# Patient Record
Sex: FEMALE | Race: WHITE | NOT HISPANIC OR LATINO | Employment: UNEMPLOYED | URBAN - METROPOLITAN AREA
[De-identification: names, ages, dates, MRNs, and addresses within clinical notes are randomized per-mention and may not be internally consistent; named-entity substitution may affect disease eponyms.]

---

## 2018-03-07 ENCOUNTER — HOSPITAL ENCOUNTER (OUTPATIENT)
Dept: PHYSICAL THERAPY | Facility: HOSPITAL | Age: 48
Setting detail: THERAPIES SERIES
Discharge: HOME | End: 2018-03-07

## 2018-03-07 ENCOUNTER — TRANSCRIBE ORDERS (OUTPATIENT)
Dept: PHYSICAL THERAPY | Facility: HOSPITAL | Age: 48
End: 2018-03-07

## 2018-03-07 DIAGNOSIS — M22.42 CHONDROMALACIA PATELLAE OF LEFT KNEE: Primary | ICD-10-CM

## 2018-03-07 DIAGNOSIS — M76.32 IT BAND SYNDROME, LEFT: Primary | ICD-10-CM

## 2018-03-07 DIAGNOSIS — M70.62 TROCHANTERIC BURSITIS OF LEFT HIP: ICD-10-CM

## 2018-03-07 PROCEDURE — 97161 PT EVAL LOW COMPLEX 20 MIN: CPT | Performed by: PHYSICAL THERAPIST

## 2018-03-07 NOTE — THERAPY EVALUATION
Outpatient Physical Therapy Ortho Initial Evaluation  [facility]     Patient Name: Lissy Ozuna  : 1970  MRN: [id number]  Today's Date: 3/7/2018      Visit Date: 2018    There is no problem list on file for this patient.       No past medical history on file.     No past surgical history on file.    Visit Dx:     ICD-10-CM ICD-9-CM   1. It band syndrome, left M76.32 728.89             Patient History       18 0900          History    Chief Complaint Difficulty with daily activities;Pain  -RB      Type of Pain Hip pain;Knee pain  -RB      Brief Description of Current Complaint Pt reports chronic joint pn for most of her life. Last year or two has had increased pn in the L hip/knee. Pn sometimes radiates, but often isolated to hip pn and knee pn. Pn is constant, but varies in intensity. Describes as an achiness, knee feels swollen. Pn worsened w/ deep squatting, prolonged standing, typically affects just the knee. Unsure of specific factors that worsen the hip pn, though usually comes on once the knee is already hurting. Denies numbness or tingling. Able to do most daily activities, just w/ difficulty. Pn improved w/ sitting, rest, use of heating pad. Pn located behind the knee cap, lateral hip  -RB      Previous treatment for THIS PROBLEM Medication  -RB      Onset Date- PT 2018  -RB      Patient/Caregiver Goals Relieve pain;Know what to do to help the symptoms  -RB      Current Tobacco Use None  -RB      Patient's Rating of General Health Very good  -RB      Hand Dominance right-handed  -RB      Occupation/sports/leisure activities Enjoys walking, spending time w/ her kids outside.  -RB      Patient seeing anyone else for problem(s)? Yes  -RB      What clinical tests have you had for this problem? X-ray  -RB      Results of Clinical Tests unremarkable  -RB      Pain     Pain Location Knee  -RB      Pain at Present 3  -RB      Pain at Best 2  -RB      Pain at Worst 9  -RB      Pain  Frequency Constant/continuous  -RB      Pain Description Aching  -RB      Fall Risk Assessment    Any falls in the past year: No  -RB      Daily Activities    Primary Language English  -RB      Are you able to read Yes  -RB      Are you able to write Yes  -RB      How does patient learn best? Listening;Reading  -RB      Teaching needs identified Home Exercise Program;Management of Condition  -RB      Patient is concerned about/has problems with Flexibility;Performing home management (household chores, shopping, care of dependents);Walking  -RB      Does patient have problems with the following? None  -RB      Barriers to learning None  -RB      Pt Participated in POC and Goals Yes  -RB      Safety    Are you being hurt, hit, or frightened by anyone at home or in your life? No  -RB      Are you being neglected by a caregiver No  -RB        User Key  (r) = Recorded By, (t) = Taken By, (c) = Cosigned By    Initials Name Provider Type    RB Adilene Bolden, PT Physical Therapist                PT Ortho       03/07/18 0900    Posture/Observations    Posture- WNL Posture is WNL  -RB    Special Tests/Palpation    Special Tests/Palpation Hip;Knee  -RB    Hip/Thigh Palpation    Greater Trochanter Left:;Tender  -RB    ITB Left:;Tender  -RB    Hip/Thigh Palpation? Yes  -RB    Hip Special Tests    Trendelenberg sign (gluteus medius weakness) Negative  -RB    HARRIET (hip vs SI pathology) Left:;Positive   pn lateral hip  -RB    Martha’s test (tightness of ITB) Left:;Positive  -RB    Ely’s test (rectus femoris tightness) Left:;Positive  -RB    Hip scour test (labral vs hip pathology) Negative  -RB    Piriformis test (piriformis syndrome) Negative  -RB    Patellar Accessory Motions    Patellar Accessory Motions Tested? Yes  -RB    Medial glide Left:;Hypomobile  -RB    Knee Special Tests    Anterior drawer (ACL lesion) Negative  -RB    Posterior drawer (PCL lesion) Negative  -RB    Valgus stress (MCL lesion) Negative  -RB    Varus  stress (LCL lesion) Negative  -RB    Thessaly test (meniscal lesion) Negative  -RB    Ghada’s test (meniscal lesion) Negative  -RB    Hernández compression test (distal ITB syndrome) Left:;Positive  -RB    ROM (Range of Motion)    General ROM Detail B hip ROM WNL, reports tightness L hip w/ ER  -RB    MMT (Manual Muscle Testing)    General MMT Assessment lower extremity strength deficits identified  -RB    Left Hip    Hip Flexion Gross Movement (5/5) normal  -RB    Hip Extension Gross Movement (4+/5) good plus  -RB    Hip ABduction Gross Movement (4/5) good  -RB    Hip Int (Medial) Rotation Gross Movement (5/5) normal  -RB    Hip Ext (Lat) Rotation Gross Movement (4+/5) good plus  -RB    Right Hip    Hip Flexion Gross Movement (5/5) normal  -RB    Hip Extension Gross Movement (4+/5) good plus  -RB    Hip ABduction Gross Movement (5/5) normal  -RB    Hip Ext (Lat) Rotation Gross Movement (5/5) normal  -RB    Lower Extremity    Lower Ext Manual Muscle Testing left hip strength deficit;right hip strength deficit;left knee strength deficit;right knee strength deficit  -RB    Left Knee    Knee Extension Gross Movement (5/5) normal  -RB    Knee Flexion Gross Movement (5/5) normal  -RB    Right Knee    Knee Extension Gross Movement (5/5) normal  -RB    Knee Flexion Gross Movement (5/5) normal  -RB    Flexibility    Flexibility Tested? Lower Extremity  -RB    Lower Extremity Flexibility    Hamstrings Left:;Mildly limited  -RB    Quadriceps Left:;Mildly limited  -RB    ITB Left:;Moderately limited  -RB      User Key  (r) = Recorded By, (t) = Taken By, (c) = Cosigned By    Initials Name Provider Type    RB Adilene Bolden, PT Physical Therapist                      Therapy Education  Education Details: Issued initial HEP including ITB and quad stretches, SL hip abd, clamshells.  Given: HEP  Program: New  How Provided: Verbal, Demonstration, Written  Provided to: Patient  Level of Understanding: Teach back education performed            PT OP Goals       03/07/18 1355 03/07/18 1300    PT Short Term Goals    STG Date to Achieve  03/28/18  -RB    STG 1  Pt to be compliant w/ initial HEP for strengthening, flexibility, symptom mgmt  -RB    STG 1 Progress  New  -RB    STG 2  Pt to report at least a 25% reduction in pn.  -RB    STG 2 Progress  New  -RB    STG 3  Pt to improve L hip abd strength to at least 4+/5.  -RB    STG 3 Progress  New  -RB    Long Term Goals    LTG Date to Achieve  04/18/18  -RB    LTG 1  Pt to be independent w/ initial HEP for strengthening, flexibility, symptom mgmt  -RB    LTG 1 Progress  New  -RB    LTG 2  Pt to report at least a 50% reduction in pn.  -RB    LTG 2 Progress  New  -RB    LTG 3  Pt to demo ITB flexibility L=R.  -RB    LTG 3 Progress  New  -RB    Time Calculation    PT Goal Re-Cert Due Date 06/05/18  -RB 06/05/18  -RB      User Key  (r) = Recorded By, (t) = Taken By, (c) = Cosigned By    Initials Name Provider Type    RB Adilene Bolden, PT Physical Therapist                PT Assessment/Plan       03/07/18 1351       PT Assessment    Functional Limitations Limitations in functional capacity and performance;Performance in leisure activities;Limitation in home management  -RB     Impairments Impaired flexibility;Muscle strength;Pain  -RB     Assessment Comments Pt presents w/ complaint of L hip and knee pn. Findings consistent w/ ITB syndrome, trochanteric bursitis. She demonstrates deficits in L ITB and hamstring flexibility, patellar mobility, L hip strength limiting tolerance to standing, walking, and performing daily activities. Pt would benefit from skilled PT services to address deficits, decrease pn, and maximize function.  -RB     Please refer to paper survey for additional self-reported information Yes  -RB     Rehab Potential Good  -RB     Patient/caregiver participated in establishment of treatment plan and goals Yes  -RB     Patient would benefit from skilled therapy intervention Yes  -RB     PT  Plan    PT Frequency 1x/week  -RB     Predicted Duration of Therapy Intervention (days/wks) 6-8 visits  -RB     Planned CPT's? PT EVAL LOW COMPLEXITY: 45828;PT RE-EVAL: 19251;PT THER PROC EA 15 MIN: 95617;PT MANUAL THERAPY EA 15 MIN: 30002;PT ELECTRICAL STIM UNATTEND: ;PT ULTRASOUND EA 15 MIN: 78803;PT IONTOPHORESIS EA 15 MIN: 82465;PT HOT/COLD PACK WC NONMCARE: 07115  -RB     PT Plan Comments PT POC to include progressive hip/knee strengthening, core stabilization, stretching, manual therapy techniques, modalities as indicated for pn control.  -RB       User Key  (r) = Recorded By, (t) = Taken By, (c) = Cosigned By    Initials Name Provider Type    RB Adilene Bolden, PT Physical Therapist                                        Time Calculation:   Start Time: 0930     Therapy Charges for Today     Code Description Service Date Service Provider Modifiers Qty    08050153916  PT EVAL LOW COMPLEXITY 3 3/7/2018 Adilene Bolden, PT GP 1                    Adilene Bolden, PT  3/7/2018

## 2018-03-16 ENCOUNTER — HOSPITAL ENCOUNTER (OUTPATIENT)
Dept: PHYSICAL THERAPY | Facility: HOSPITAL | Age: 48
Setting detail: THERAPIES SERIES
Discharge: HOME | End: 2018-03-16

## 2018-03-16 DIAGNOSIS — M76.32 IT BAND SYNDROME, LEFT: Primary | ICD-10-CM

## 2018-03-16 PROCEDURE — 97110 THERAPEUTIC EXERCISES: CPT | Performed by: PHYSICAL THERAPIST

## 2018-03-16 NOTE — THERAPY TREATMENT NOTE
Outpatient Physical Therapy Ortho Treatment Note  [facility]     Patient Name: Lissy Ozuna  : 1970  MRN: [id number]  Today's Date: 3/16/2018      Visit Date: 2018    Visit Dx:    ICD-10-CM ICD-9-CM   1. It band syndrome, left M76.32 728.89       There is no problem list on file for this patient.       No past medical history on file.     No past surgical history on file.          PT Ortho     Row Name 18 0900       Subjective Comments    Subjective Comments Had some pn the day of her initial evaluation, otherwise things are about the same. Fatigues very quickly w/ HEP, usually within 5-10 reps  -RB      User Key  (r) = Recorded By, (t) = Taken By, (c) = Cosigned By    Initials Name Provider Type    LORE Bolden, PT Physical Therapist                            PT Assessment/Plan     Row Name 18 0920          PT Assessment    Assessment Comments Pt subjectively reports being able to tolerate progressively more reps w/ HEP. She fatigues quickly w/ [facility] hip strengthening activities, but denies increased pn w/ ther ex.  -RB        PT Plan    PT Plan Comments Cont per POC. Assess response to updated HEP. Add standing hip hikes, sidesteps w/ TB  -RB       User Key  (r) = Recorded By, (t) = Taken By, (c) = Cosigned By    Initials Name Provider Type    LORE Bolden, PT Physical Therapist                    Exercises     Row Name 18 0900             Subjective Comments    Subjective Comments Had some pn the day of her initial evaluation, otherwise things are about the same. Fatigues very quickly w/ HEP, usually within 5-10 reps  -RB         Subjective Pain    Able to rate subjective pain? yes  -RB      Pre-Treatment Pain Level 1  -RB      Post-Treatment Pain Level 1  -RB      Subjective Pain Comment lateral L hip  -RB         Exercise 1    Exercise Name 1 Initiated ther ex in clinic as per flow sheet w/ focus on hamstring/ITB/quad flexibility and low level hip strengthening.   -RB      Time 1 30  -RB        User Key  (r) = Recorded By, (t) = Taken By, (c) = Cosigned By    Initials Name Provider Type    RB Adilene Bolden, PT Physical Therapist                               PT OP Goals     Row Name 03/16/18 0921          Time Calculation    PT Goal Re-Cert Due Date 06/05/18  -RB       User Key  (r) = Recorded By, (t) = Taken By, (c) = Cosigned By    Initials Name Provider Type    RB Adilene Bolden PT Physical Therapist          Therapy Education  Education Details: updated HEP to include bridges w/ abd using TB and prone glute hip extn.  Given: HEP  Program: Progressed  How Provided: Verbal, Demonstration, Written  Provided to: Patient  Level of Understanding: Teach back education performed              Time Calculation:   Start Time: 0845  Total Timed Code Minutes- PT: 30 minute(s)    Therapy Charges for Today     Code Description Service Date Service Provider Modifiers Qty    30422952725 HC PT THER PROC EA 15 MIN 3/16/2018 Adilene Bolden, PT GP 2                    Adilene Bolden, PT  3/16/2018

## 2018-03-21 ENCOUNTER — HOSPITAL ENCOUNTER (OUTPATIENT)
Dept: PHYSICAL THERAPY | Facility: HOSPITAL | Age: 48
Setting detail: THERAPIES SERIES
Discharge: HOME | End: 2018-03-21

## 2018-03-21 DIAGNOSIS — M76.32 IT BAND SYNDROME, LEFT: Primary | ICD-10-CM

## 2018-03-21 PROCEDURE — 97110 THERAPEUTIC EXERCISES: CPT | Performed by: PHYSICAL THERAPIST

## 2018-03-21 NOTE — THERAPY TREATMENT NOTE
Outpatient Physical Therapy Ortho Treatment Note  [facility]     Patient Name: Lissy Ozuna  : 1970  MRN: [id number]  Today's Date: 3/21/2018      Visit Date: 2018    Visit Dx:    ICD-10-CM ICD-9-CM   1. It band syndrome, left M76.32 728.89       There is no problem list on file for this patient.       No past medical history on file.     No past surgical history on file.          PT Ortho     Row Name 18 0900       Subjective Comments    Subjective Comments Pn mostly in L knee today. Pn in hip seems to be improving, hasn't had to use her heating pad nearly as often. Still unable to squat to reach into lower cabinets.  -RB       Subjective Pain    Able to rate subjective pain? yes  -RB    Pre-Treatment Pain Level 1  -RB    Post-Treatment Pain Level 1  -RB    Subjective Pain Comment L knee  -RB      User Key  (r) = Recorded By, (t) = Taken By, (c) = Cosigned By    Initials Name Provider Type    LORE Bolden, PT Physical Therapist                            PT Assessment/Plan     Row Name 18 1013          PT Assessment    Assessment Comments Pt tolerated tx session well. Reviewed proper technique w/ squats which significantly reduced pn. Pt most challenged by clamshells.  -RB        PT Plan    PT Plan Comments Cont per POC- add standing hip abd w/ TB, sidesteps w/ TB  -RB       User Key  (r) = Recorded By, (t) = Taken By, (c) = Cosigned By    Initials Name Provider Type    LORE Bolden, PT Physical Therapist                    Exercises     Row Name 18 0900             Subjective Comments    Subjective Comments Pn mostly in L knee today. Pn in hip seems to be improving, hasn't had to use her heating pad nearly as often. Still unable to squat to reach into lower cabinets.  -RB         Subjective Pain    Able to rate subjective pain? yes  -RB      Pre-Treatment Pain Level 1  -RB      Post-Treatment Pain Level 1  -RB      Subjective Pain Comment L knee  -RB         Exercise  1    Exercise Name 1 Continued ther ex in clinic as per flow sheet. Added offstep hip hikes and squats w/ cueing for technique.  -RB      Time 1 30  -RB        User Key  (r) = Recorded By, (t) = Taken By, (c) = Cosigned By    Initials Name Provider Type    LORE Bolden, PT Physical Therapist                               PT OP Goals     Row Name 03/21/18 1014          Time Calculation    PT Goal Re-Cert Due Date 06/05/18  -RB       User Key  (r) = Recorded By, (t) = Taken By, (c) = Cosigned By    Initials Name Provider Type    LORE Bolden, PT Physical Therapist                         Time Calculation:   Start Time: 0945  Total Timed Code Minutes- PT: 30 minute(s)    Therapy Charges for Today     Code Description Service Date Service Provider Modifiers Qty    21889672576 HC PT THER PROC EA 15 MIN 3/21/2018 Adilene Bolden, PT GP 2                    Adilene Bolden, PT  3/21/2018

## 2018-04-04 ENCOUNTER — HOSPITAL ENCOUNTER (OUTPATIENT)
Dept: PHYSICAL THERAPY | Facility: HOSPITAL | Age: 48
Setting detail: THERAPIES SERIES
Discharge: HOME | End: 2018-04-04

## 2018-04-04 DIAGNOSIS — M76.32 IT BAND SYNDROME, LEFT: Primary | ICD-10-CM

## 2018-04-04 PROCEDURE — 97110 THERAPEUTIC EXERCISES: CPT | Performed by: PHYSICAL THERAPIST

## 2018-04-04 NOTE — THERAPY PROGRESS REPORT/RE-CERT
Outpatient Physical Therapy Ortho Re-Assessment   [facility]     Patient Name: Lissy Ozuna  : 1970  MRN: [id number]  Today's Date: 2018      Visit Date: 2018    There is no problem list on file for this patient.       No past medical history on file.     No past surgical history on file.    Visit Dx:     ICD-10-CM ICD-9-CM   1. It band syndrome, left M76.32 728.89                 PT Ortho     Row Name 18 0900       Subjective Comments    Subjective Comments Symptoms much improved. Has not noticed any pn in the L hip or knee, only tightness in lateral hip and thigh.  -RB       Subjective Pain    Able to rate subjective pain? yes  -RB    Pre-Treatment Pain Level 0  -RB    Post-Treatment Pain Level 0  -RB       Hip Special Tests    HARRIET (hip vs SI pathology) Negative  -RB    Martha’s test (tightness of ITB) Left:;Positive  -RB    Ely’s test (rectus femoris tightness) Negative  -RB       Knee Special Tests    Noble compression test (distal ITB syndrome) Negative  -RB       General ROM    GENERAL ROM COMMENTS B hip/knee ROM WNL and pn free  -RB       MMT (Manual Muscle Testing)    Additional Documentation General Assessment (Manual Muscle Testing) (Group)  -RB       General Assessment (Manual Muscle Testing)    General Manual Muscle Testing (MMT) Assessment lower extremity strength deficits identified  -RB       Lower Extremity (Manual Muscle Testing)    Lower Extremity: Manual Muscle Testing (MMT) left hip strength deficit;left knee strength deficit  -RB       Left Hip (Manual Muscle Testing)    Left Hip Manual Muscle Testing (MMT) flexion;extension;abduction;external rotation  -RB    MMT: Flexion, Left Hip flexion  -RB    MMT, Gross Movement: Left Hip Flexion --   4+/5  -RB    MMT: Extension, Left Hip extension  -RB    MMT, Gross Movement: Left Hip Extension (5/5) normal  -RB    MMT: ABduction, Left Hip abduction  -RB    MMT, Gross Movement: Left Hip ABduction (5/5) normal  -RB    MMT,  Gross Movement: Left Hip External (Lateral) Rotation (5/5) normal  -RB       Left Knee (Manual Muscle Testing)    Left Knee Manual Muscle Testing (MMT) extension;flexion  -RB    MMT: Extension, Left Knee extension  -RB    MMT, Gross Movement: Left Knee Extension (5/5) normal  -RB    MMT: Flexion, Left Knee flexion  -RB    MMT, Gross Movement: Left Knee Flexion (5/5) normal  -RB       Lower Extremity Flexibility    Hamstrings WNL  -RB    Quadriceps WNL  -RB    ITB Left:;Mildly limited  -RB      User Key  (r) = Recorded By, (t) = Taken By, (c) = Cosigned By    Initials Name Provider Type    LORE Bolden, PT Physical Therapist                      Therapy Education  Education Details: progressed HEP as outlined in chart  Given: HEP  Program: Progressed  How Provided: Verbal, Demonstration, Written  Provided to: Patient  Level of Understanding: Teach back education performed           PT OP Goals     Row Name 04/04/18 0945 04/04/18 0900       PT Short Term Goals    STG Date to Achieve  -- 03/28/18  -RB    STG 1  -- Pt to be compliant w/ initial HEP for strengthening, flexibility, symptom mgmt  -RB    STG 1 Progress  -- Met  -RB    STG 2  -- Pt to report at least a 25% reduction in pn.  -RB    STG 2 Progress  -- Met  -RB    STG 3  -- Pt to improve L hip abd strength to at least 4+/5.  -RB    STG 3 Progress  -- Met  -RB       Long Term Goals    LTG Date to Achieve  -- 04/18/18  -RB    LTG 1  -- Pt to be independent w/ initial HEP for strengthening, flexibility, symptom mgmt  -RB    LTG 1 Progress  -- Progressing  -RB    LTG 2  -- Pt to report at least a 50% reduction in pn.  -RB    LTG 2 Progress  -- Met  -RB    LTG 3  -- Pt to demo ITB flexibility L=R.  -RB    LTG 3 Progress  -- Progressing  -RB       Time Calculation    PT Goal Re-Cert Due Date 06/05/18  -RB 06/05/18  -RB      User Key  (r) = Recorded By, (t) = Taken By, (c) = Cosigned By    Initials Name Provider Type    LORE Bolden, PT Physical Therapist                 PT Assessment/Plan     Row Name 04/04/18 0942          PT Assessment    Functional Limitations Limitations in functional capacity and performance  -RB     Impairments Impaired flexibility  -RB     Assessment Comments Pt has made good progress w/ PT. She has made significant gains in hip/knee strength and flexibility, and subjectively reports nearly full resolution of pn. She continues to have mild L ITB tightness that causes occasional discomfort. She would benefit from 1-2 additional visits to restore full ITB flexibility and finalize HEP.  -RB     Please refer to paper survey for additional self-reported information Yes  -RB     Rehab Potential Good  -RB     Patient/caregiver participated in establishment of treatment plan and goals Yes  -RB     Patient would benefit from skilled therapy intervention Yes  -RB        PT Plan    Planned CPT's? PT RE-EVAL: 86996;PT THER PROC EA 15 MIN: 91355;PT MANUAL THERAPY EA 15 MIN: 14359;PT HOT/COLD PACK WC NONMCARE: 48760  -RB     PT Plan Comments Assess response to updated HEP, continue w/ focus on restoring ITB flexibility. Likely will plan d/c next visit.  -RB       User Key  (r) = Recorded By, (t) = Taken By, (c) = Cosigned By    Initials Name Provider Type    RB Adilene Bolden, PT Physical Therapist                  Exercises     Row Name 04/04/18 0900             Subjective Comments    Subjective Comments Symptoms much improved. Has not noticed any pn in the L hip or knee, only tightness in lateral hip and thigh.  -RB         Subjective Pain    Able to rate subjective pain? yes  -RB      Pre-Treatment Pain Level 0  -RB      Post-Treatment Pain Level 0  -RB         Exercise 1    Exercise Name 1 Performed reassessment then continued w/ ther ex as per flow sheet. Progressed to standing ITB stretch, standing hip abd/extn w/ TB. Reviewed HEP modifications.  -RB      Time 1 30  -RB        User Key  (r) = Recorded By, (t) = Taken By, (c) = Cosigned By    Initials  Name Provider Type     Adilene Bolden, PT Physical Therapist                        Outcome Measure Options: Lower Extremity Functional Scale (LEFS)  Lower Extremity Functional Index  Any of your usual work, housework or school activities: No difficulty  Your usual hobbies, recreational or sporting activities: No difficulty  Getting into or out of the bath: No difficulty  Walking between rooms: No difficulty  Putting on your shoes or socks: No difficulty  Squatting: No difficulty  Lifting an object, like a bag of groceries from the floor: No difficulty  Performing light activities around your home: No difficulty  Performing heavy activities around your home: No difficulty  Getting into or out of a car: No difficulty  Walking 2 blocks: No difficulty  Walking a mile: No difficulty  Going up or down 10 stairs (about 1 flight of stairs): No difficulty  Standing for 1 hour: No difficulty  Sitting for 1 hour: No difficulty  Running on even ground: No difficulty  Running on uneven ground: No difficulty  Making sharp turns while running fast: No difficulty  Hopping: No difficulty  Rolling over in bed: No difficulty  Total: 80      Time Calculation:   Start Time: 0900  Total Timed Code Minutes- PT: 30 minute(s)     Therapy Charges for Today     Code Description Service Date Service Provider Modifiers Qty    62352357419 HC PT THER PROC EA 15 MIN 4/4/2018 Adilene Bolden, PT GP 2          PT G-Codes  Outcome Measure Options: Lower Extremity Functional Scale (LEFS)         Adilene Bolden, PT  4/4/2018

## 2018-04-11 ENCOUNTER — HOSPITAL ENCOUNTER (OUTPATIENT)
Dept: PHYSICAL THERAPY | Facility: HOSPITAL | Age: 48
Setting detail: THERAPIES SERIES
Discharge: HOME | End: 2018-04-11

## 2018-04-11 DIAGNOSIS — M76.32 IT BAND SYNDROME, LEFT: Primary | ICD-10-CM

## 2018-04-11 PROCEDURE — 97110 THERAPEUTIC EXERCISES: CPT | Performed by: PHYSICAL THERAPIST

## 2018-04-11 NOTE — THERAPY TREATMENT NOTE
Outpatient Physical Therapy Ortho Treatment Note  [facility]     Patient Name: Lissy Ozuna  : 1970  MRN: [id number]  Today's Date: 2018      Visit Date: 2018    Visit Dx:    ICD-10-CM ICD-9-CM   1. It band syndrome, left M76.32 728.89       There is no problem list on file for this patient.       No past medical history on file.     No past surgical history on file.          PT Ortho     Row Name 18 0900       Subjective Comments    Subjective Comments Has been doing well, no pn over the past week in the hip or knee. Back bothering her some after lifting a dresser the other day. Still feels some tightness in the lateral hip.  -RB       Subjective Pain    Able to rate subjective pain? yes  -RB    Pre-Treatment Pain Level 0  -RB    Post-Treatment Pain Level 0  -RB      User Key  (r) = Recorded By, (t) = Taken By, (c) = Cosigned By    Initials Name Provider Type    LORE Bolden, PT Physical Therapist                            PT Assessment/Plan     Row Name 18 0941          PT Assessment    Assessment Comments Pt continues to deny return of pn L hip and knee, but reports occasional tightness/discomfort in lateral hip/proximal ITB. She did well w/ progression of current HEP.  -RB        PT Plan    PT Plan Comments Pt to trial independent HEP x2 weeks then return for follow up if any issues.  -RB       User Key  (r) = Recorded By, (t) = Taken By, (c) = Cosigned By    Initials Name Provider Type    LORE Bolden, PT Physical Therapist                    Exercises     Row Name 18 09             Subjective Comments    Subjective Comments Has been doing well, no pn over the past week in the hip or knee. Back bothering her some after lifting a dresser the other day. Still feels some tightness in the lateral hip.  -RB         Subjective Pain    Able to rate subjective pain? yes  -RB      Pre-Treatment Pain Level 0  -RB      Post-Treatment Pain Level 0  -RB          Exercise 1    Exercise Name 1 Continued ther ex in clinic as per flow sheet. Progressed to include quadruped fire hydrants, quadruped glut kicks, and SLS RDL w/ cone tap. Reviewed final HEP  -RB      Time 1 40  -RB        User Key  (r) = Recorded By, (t) = Taken By, (c) = Cosigned By    Initials Name Provider Type    LORE Bolden PT Physical Therapist                               PT OP Goals     Row Name 04/11/18 0943          Time Calculation    PT Goal Re-Cert Due Date 06/05/18  -RB       User Key  (r) = Recorded By, (t) = Taken By, (c) = Cosigned By    Initials Name Provider Type    LORE Bolden PT Physical Therapist          Therapy Education  Education Details: issued final HEP as outlined in chart. Provided GTB for exercise progression.  Program: Progressed  How Provided: Verbal, Demonstration, Written  Provided to: Patient  Level of Understanding: Teach back education performed              Time Calculation:   Start Time: 0900  Total Timed Code Minutes- PT: 40 minute(s)    Therapy Charges for Today     Code Description Service Date Service Provider Modifiers Qty    69645869539 HC PT THER PROC EA 15 MIN 4/11/2018 Adilene Bolden, PT GP 2                    Adilene Bolden, PT  4/11/2018

## 2018-04-18 ENCOUNTER — APPOINTMENT (OUTPATIENT)
Dept: PHYSICAL THERAPY | Facility: HOSPITAL | Age: 48
End: 2018-04-18

## 2018-05-10 ENCOUNTER — DOCUMENTATION (OUTPATIENT)
Dept: PHYSICAL THERAPY | Facility: HOSPITAL | Age: 48
End: 2018-05-10

## 2018-05-10 DIAGNOSIS — M76.32 IT BAND SYNDROME, LEFT: Primary | ICD-10-CM

## 2018-05-10 NOTE — THERAPY DISCHARGE NOTE
Outpatient Physical Therapy Discharge Summary         Patient Name: Lissy Ozuna  : 1970  MRN: [id number]    Today's Date: 5/10/2018    Visit Dx:    ICD-10-CM ICD-9-CM   1. It band syndrome, left M76.32 728.89             PT OP Goals     Row Name 05/10/18 1300          PT Short Term Goals    STG Date to Achieve 18  -RB     STG 1 Pt to be compliant w/ initial HEP for strengthening, flexibility, symptom mgmt  -RB     STG 1 Progress Met  -RB     STG 2 Pt to report at least a 25% reduction in pn.  -RB     STG 2 Progress Met  -RB     STG 3 Pt to improve L hip abd strength to at least 4+/5.  -RB     STG 3 Progress Met  -RB        Long Term Goals    LTG Date to Achieve 18  -RB     LTG 1 Pt to be independent w/ initial HEP for strengthening, flexibility, symptom mgmt  -RB     LTG 1 Progress Met  -RB     LTG 2 Pt to report at least a 50% reduction in pn.  -RB     LTG 2 Progress Met  -RB     LTG 3 Pt to demo ITB flexibility L=R.  -RB     LTG 3 Progress Partially Met  -RB       User Key  (r) = Recorded By, (t) = Taken By, (c) = Cosigned By    Initials Name Provider Type    RB Adilene Bolden, PT Physical Therapist          OP PT Discharge Summary  Date of Discharge: 05/10/18  Reason for Discharge: Independent  Outcomes Achieved: Patient able to partially acheive established goals  Discharge Destination: Home with home program  Discharge Instructions/Additional Comments: Pt progressed very well w/ PT. She met nearly all goals and reported virtually full reduction in pn. D/c to independent HEP      Time Calculation:                    Adilene Bolden, PT  5/10/2018